# Patient Record
Sex: MALE | Race: WHITE | URBAN - METROPOLITAN AREA
[De-identification: names, ages, dates, MRNs, and addresses within clinical notes are randomized per-mention and may not be internally consistent; named-entity substitution may affect disease eponyms.]

---

## 2020-07-06 ENCOUNTER — HISTORICAL (OUTPATIENT)
Dept: ENDOSCOPY | Facility: HOSPITAL | Age: 40
End: 2020-07-06

## 2022-04-30 NOTE — H&P
HISTORY OF PRESENT ILLNESS:  A 39-year-old white man referred for evaluation of persistent hematochezia over the past 6 months.  The patient has a history of mild diverticulosis in the descending colon by colonoscopy in June of 2010 which was performed because of a bout of diverticulitis.  The patient denies any change in bowel functions or rectal pain.  He has noted some bright red blood as well as some blood clots which has him concerned.    ALLERGIES:  Penicillin.    MEDICATIONS:    1. Lorazepam.  2. Natural thyroid.    PAST MEDICAL HISTORY:  History of diverticulitis.    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    SOCIAL HISTORY:  He is an .  He is , 2 children.    FAMILY HISTORY:  His father had part of his colon removed for diverticulitis.    PHYSICAL EXAMINATION:  VITAL SIGNS:  Stable.  Afebrile.   GENERAL:  He is a healthy-appearing young man in no acute distress.   HEENT:  Sclerae nonicteric.  Conjunctivae pink.  No adenopathy or thyromegaly.   CARDIOVASCULAR:  Regular rate and rhythm.   LUNGS:  Clear.   ABDOMEN:  Soft, nontender.  Bowel sounds normal.  No mass or organomegaly appreciated.   EXTREMITIES:  No clubbing, cyanosis, or edema.     NEURO:  Alert and oriented.  No focal deficits.    IMPRESSION:  Persistent hematochezia of uncertain etiology.    RECOMMENDATION:  We will proceed with colonoscopy evaluation.       Thank you for this kind referral.        ______________________________  MD MARCOS Gorman/SK  DD:  07/06/2020  Time:  10:35AM  DT:  07/06/2020  Time:  10:48AM  Job #:  [id number]    The H&P was reviewed, the patient was examined, and the following changes to the patients condition are noted:  ______________________________________________________________________________  ______________________________________________________________________________  ______________________________________________________________________________  [   ] No changes to the patient's condition:      ______________________________                                             ___________________  PHYSICIAN SIGNATURE                                                             DATE/TIME    cc: Herbie Greer MD